# Patient Record
Sex: FEMALE | Race: WHITE
[De-identification: names, ages, dates, MRNs, and addresses within clinical notes are randomized per-mention and may not be internally consistent; named-entity substitution may affect disease eponyms.]

---

## 2018-01-23 ENCOUNTER — HOSPITAL ENCOUNTER (INPATIENT)
Dept: HOSPITAL 75 - LDRP | Age: 43
LOS: 4 days | Discharge: HOME | End: 2018-01-27
Attending: OBSTETRICS & GYNECOLOGY | Admitting: OBSTETRICS & GYNECOLOGY
Payer: MEDICAID

## 2018-01-23 VITALS — DIASTOLIC BLOOD PRESSURE: 61 MMHG | SYSTOLIC BLOOD PRESSURE: 126 MMHG

## 2018-01-23 VITALS — DIASTOLIC BLOOD PRESSURE: 57 MMHG | SYSTOLIC BLOOD PRESSURE: 112 MMHG

## 2018-01-23 VITALS — DIASTOLIC BLOOD PRESSURE: 57 MMHG | SYSTOLIC BLOOD PRESSURE: 124 MMHG

## 2018-01-23 VITALS — DIASTOLIC BLOOD PRESSURE: 74 MMHG | SYSTOLIC BLOOD PRESSURE: 120 MMHG

## 2018-01-23 VITALS — SYSTOLIC BLOOD PRESSURE: 91 MMHG | DIASTOLIC BLOOD PRESSURE: 50 MMHG

## 2018-01-23 VITALS — HEIGHT: 66 IN | BODY MASS INDEX: 35.68 KG/M2 | WEIGHT: 222 LBS

## 2018-01-23 VITALS — DIASTOLIC BLOOD PRESSURE: 55 MMHG | SYSTOLIC BLOOD PRESSURE: 109 MMHG

## 2018-01-23 VITALS — DIASTOLIC BLOOD PRESSURE: 53 MMHG | SYSTOLIC BLOOD PRESSURE: 107 MMHG

## 2018-01-23 VITALS — DIASTOLIC BLOOD PRESSURE: 51 MMHG | SYSTOLIC BLOOD PRESSURE: 103 MMHG

## 2018-01-23 VITALS — SYSTOLIC BLOOD PRESSURE: 102 MMHG | DIASTOLIC BLOOD PRESSURE: 51 MMHG

## 2018-01-23 VITALS — DIASTOLIC BLOOD PRESSURE: 55 MMHG | SYSTOLIC BLOOD PRESSURE: 107 MMHG

## 2018-01-23 VITALS — DIASTOLIC BLOOD PRESSURE: 90 MMHG | SYSTOLIC BLOOD PRESSURE: 118 MMHG

## 2018-01-23 VITALS — DIASTOLIC BLOOD PRESSURE: 56 MMHG | SYSTOLIC BLOOD PRESSURE: 114 MMHG

## 2018-01-23 VITALS — DIASTOLIC BLOOD PRESSURE: 74 MMHG | SYSTOLIC BLOOD PRESSURE: 125 MMHG

## 2018-01-23 VITALS — DIASTOLIC BLOOD PRESSURE: 56 MMHG | SYSTOLIC BLOOD PRESSURE: 116 MMHG

## 2018-01-23 VITALS — SYSTOLIC BLOOD PRESSURE: 111 MMHG | DIASTOLIC BLOOD PRESSURE: 56 MMHG

## 2018-01-23 VITALS — DIASTOLIC BLOOD PRESSURE: 55 MMHG | SYSTOLIC BLOOD PRESSURE: 116 MMHG

## 2018-01-23 VITALS — DIASTOLIC BLOOD PRESSURE: 65 MMHG | SYSTOLIC BLOOD PRESSURE: 120 MMHG

## 2018-01-23 VITALS — SYSTOLIC BLOOD PRESSURE: 72 MMHG | DIASTOLIC BLOOD PRESSURE: 40 MMHG

## 2018-01-23 VITALS — SYSTOLIC BLOOD PRESSURE: 120 MMHG | DIASTOLIC BLOOD PRESSURE: 71 MMHG

## 2018-01-23 VITALS — SYSTOLIC BLOOD PRESSURE: 122 MMHG | DIASTOLIC BLOOD PRESSURE: 72 MMHG

## 2018-01-23 VITALS — SYSTOLIC BLOOD PRESSURE: 105 MMHG | DIASTOLIC BLOOD PRESSURE: 56 MMHG

## 2018-01-23 VITALS — SYSTOLIC BLOOD PRESSURE: 99 MMHG | DIASTOLIC BLOOD PRESSURE: 53 MMHG

## 2018-01-23 VITALS — SYSTOLIC BLOOD PRESSURE: 104 MMHG | DIASTOLIC BLOOD PRESSURE: 54 MMHG

## 2018-01-23 VITALS — SYSTOLIC BLOOD PRESSURE: 85 MMHG | DIASTOLIC BLOOD PRESSURE: 52 MMHG

## 2018-01-23 VITALS — DIASTOLIC BLOOD PRESSURE: 52 MMHG | SYSTOLIC BLOOD PRESSURE: 102 MMHG

## 2018-01-23 VITALS — DIASTOLIC BLOOD PRESSURE: 59 MMHG | SYSTOLIC BLOOD PRESSURE: 110 MMHG

## 2018-01-23 VITALS — DIASTOLIC BLOOD PRESSURE: 61 MMHG | SYSTOLIC BLOOD PRESSURE: 106 MMHG

## 2018-01-23 VITALS — DIASTOLIC BLOOD PRESSURE: 75 MMHG | SYSTOLIC BLOOD PRESSURE: 135 MMHG

## 2018-01-23 VITALS — DIASTOLIC BLOOD PRESSURE: 60 MMHG | SYSTOLIC BLOOD PRESSURE: 110 MMHG

## 2018-01-23 VITALS — SYSTOLIC BLOOD PRESSURE: 118 MMHG | DIASTOLIC BLOOD PRESSURE: 69 MMHG

## 2018-01-23 VITALS — DIASTOLIC BLOOD PRESSURE: 65 MMHG | SYSTOLIC BLOOD PRESSURE: 117 MMHG

## 2018-01-23 VITALS — DIASTOLIC BLOOD PRESSURE: 58 MMHG | SYSTOLIC BLOOD PRESSURE: 123 MMHG

## 2018-01-23 VITALS — SYSTOLIC BLOOD PRESSURE: 126 MMHG | DIASTOLIC BLOOD PRESSURE: 71 MMHG

## 2018-01-23 VITALS — SYSTOLIC BLOOD PRESSURE: 115 MMHG | DIASTOLIC BLOOD PRESSURE: 62 MMHG

## 2018-01-23 VITALS — SYSTOLIC BLOOD PRESSURE: 117 MMHG | DIASTOLIC BLOOD PRESSURE: 58 MMHG

## 2018-01-23 VITALS — DIASTOLIC BLOOD PRESSURE: 56 MMHG | SYSTOLIC BLOOD PRESSURE: 108 MMHG

## 2018-01-23 VITALS — SYSTOLIC BLOOD PRESSURE: 110 MMHG | DIASTOLIC BLOOD PRESSURE: 59 MMHG

## 2018-01-23 VITALS — DIASTOLIC BLOOD PRESSURE: 67 MMHG | SYSTOLIC BLOOD PRESSURE: 107 MMHG

## 2018-01-23 VITALS — SYSTOLIC BLOOD PRESSURE: 122 MMHG | DIASTOLIC BLOOD PRESSURE: 57 MMHG

## 2018-01-23 VITALS — SYSTOLIC BLOOD PRESSURE: 114 MMHG | DIASTOLIC BLOOD PRESSURE: 60 MMHG

## 2018-01-23 VITALS — SYSTOLIC BLOOD PRESSURE: 109 MMHG | DIASTOLIC BLOOD PRESSURE: 58 MMHG

## 2018-01-23 VITALS — SYSTOLIC BLOOD PRESSURE: 113 MMHG | DIASTOLIC BLOOD PRESSURE: 56 MMHG

## 2018-01-23 VITALS — SYSTOLIC BLOOD PRESSURE: 122 MMHG | DIASTOLIC BLOOD PRESSURE: 59 MMHG

## 2018-01-23 VITALS — SYSTOLIC BLOOD PRESSURE: 117 MMHG | DIASTOLIC BLOOD PRESSURE: 67 MMHG

## 2018-01-23 VITALS — SYSTOLIC BLOOD PRESSURE: 126 MMHG | DIASTOLIC BLOOD PRESSURE: 70 MMHG

## 2018-01-23 VITALS — DIASTOLIC BLOOD PRESSURE: 63 MMHG | SYSTOLIC BLOOD PRESSURE: 100 MMHG

## 2018-01-23 VITALS — SYSTOLIC BLOOD PRESSURE: 118 MMHG | DIASTOLIC BLOOD PRESSURE: 68 MMHG

## 2018-01-23 VITALS — DIASTOLIC BLOOD PRESSURE: 59 MMHG | SYSTOLIC BLOOD PRESSURE: 106 MMHG

## 2018-01-23 VITALS — SYSTOLIC BLOOD PRESSURE: 128 MMHG | DIASTOLIC BLOOD PRESSURE: 78 MMHG

## 2018-01-23 VITALS — SYSTOLIC BLOOD PRESSURE: 112 MMHG | DIASTOLIC BLOOD PRESSURE: 64 MMHG

## 2018-01-23 VITALS — DIASTOLIC BLOOD PRESSURE: 56 MMHG | SYSTOLIC BLOOD PRESSURE: 106 MMHG

## 2018-01-23 VITALS — SYSTOLIC BLOOD PRESSURE: 113 MMHG | DIASTOLIC BLOOD PRESSURE: 63 MMHG

## 2018-01-23 VITALS — SYSTOLIC BLOOD PRESSURE: 116 MMHG | DIASTOLIC BLOOD PRESSURE: 61 MMHG

## 2018-01-23 VITALS — DIASTOLIC BLOOD PRESSURE: 63 MMHG | SYSTOLIC BLOOD PRESSURE: 117 MMHG

## 2018-01-23 VITALS — SYSTOLIC BLOOD PRESSURE: 118 MMHG | DIASTOLIC BLOOD PRESSURE: 57 MMHG

## 2018-01-23 VITALS — SYSTOLIC BLOOD PRESSURE: 104 MMHG | DIASTOLIC BLOOD PRESSURE: 63 MMHG

## 2018-01-23 VITALS — SYSTOLIC BLOOD PRESSURE: 121 MMHG | DIASTOLIC BLOOD PRESSURE: 77 MMHG

## 2018-01-23 VITALS — DIASTOLIC BLOOD PRESSURE: 63 MMHG | SYSTOLIC BLOOD PRESSURE: 104 MMHG

## 2018-01-23 VITALS — SYSTOLIC BLOOD PRESSURE: 105 MMHG | DIASTOLIC BLOOD PRESSURE: 51 MMHG

## 2018-01-23 VITALS — SYSTOLIC BLOOD PRESSURE: 118 MMHG | DIASTOLIC BLOOD PRESSURE: 59 MMHG

## 2018-01-23 VITALS — SYSTOLIC BLOOD PRESSURE: 100 MMHG | DIASTOLIC BLOOD PRESSURE: 52 MMHG

## 2018-01-23 VITALS — DIASTOLIC BLOOD PRESSURE: 58 MMHG | SYSTOLIC BLOOD PRESSURE: 105 MMHG

## 2018-01-23 VITALS — DIASTOLIC BLOOD PRESSURE: 57 MMHG | SYSTOLIC BLOOD PRESSURE: 122 MMHG

## 2018-01-23 VITALS — DIASTOLIC BLOOD PRESSURE: 65 MMHG | SYSTOLIC BLOOD PRESSURE: 112 MMHG

## 2018-01-23 VITALS — SYSTOLIC BLOOD PRESSURE: 110 MMHG | DIASTOLIC BLOOD PRESSURE: 54 MMHG

## 2018-01-23 VITALS — SYSTOLIC BLOOD PRESSURE: 104 MMHG | DIASTOLIC BLOOD PRESSURE: 58 MMHG

## 2018-01-23 VITALS — SYSTOLIC BLOOD PRESSURE: 114 MMHG | DIASTOLIC BLOOD PRESSURE: 55 MMHG

## 2018-01-23 VITALS — SYSTOLIC BLOOD PRESSURE: 122 MMHG | DIASTOLIC BLOOD PRESSURE: 75 MMHG

## 2018-01-23 VITALS — DIASTOLIC BLOOD PRESSURE: 59 MMHG | SYSTOLIC BLOOD PRESSURE: 107 MMHG

## 2018-01-23 VITALS — SYSTOLIC BLOOD PRESSURE: 124 MMHG | DIASTOLIC BLOOD PRESSURE: 66 MMHG

## 2018-01-23 VITALS — DIASTOLIC BLOOD PRESSURE: 55 MMHG | SYSTOLIC BLOOD PRESSURE: 110 MMHG

## 2018-01-23 VITALS — SYSTOLIC BLOOD PRESSURE: 118 MMHG | DIASTOLIC BLOOD PRESSURE: 73 MMHG

## 2018-01-23 VITALS — DIASTOLIC BLOOD PRESSURE: 57 MMHG | SYSTOLIC BLOOD PRESSURE: 117 MMHG

## 2018-01-23 VITALS — DIASTOLIC BLOOD PRESSURE: 66 MMHG | SYSTOLIC BLOOD PRESSURE: 113 MMHG

## 2018-01-23 VITALS — DIASTOLIC BLOOD PRESSURE: 62 MMHG | SYSTOLIC BLOOD PRESSURE: 110 MMHG

## 2018-01-23 DIAGNOSIS — O61.0: ICD-10-CM

## 2018-01-23 DIAGNOSIS — O32.2XX0: ICD-10-CM

## 2018-01-23 DIAGNOSIS — Z3A.39: ICD-10-CM

## 2018-01-23 DIAGNOSIS — O41.1230: ICD-10-CM

## 2018-01-23 DIAGNOSIS — D62: ICD-10-CM

## 2018-01-23 LAB
BASOPHILS # BLD AUTO: 0 10^3/UL (ref 0–0.1)
BASOPHILS NFR BLD AUTO: 0 % (ref 0–10)
EOSINOPHIL # BLD AUTO: 0.1 10^3/UL (ref 0–0.3)
EOSINOPHIL NFR BLD AUTO: 1 % (ref 0–10)
ERYTHROCYTE [DISTWIDTH] IN BLOOD BY AUTOMATED COUNT: 14.4 % (ref 10–14.5)
HCT VFR BLD CALC: 34 % (ref 35–52)
HGB BLD-MCNC: 11.5 G/DL (ref 11.5–16)
LYMPHOCYTES # BLD AUTO: 1.9 X 10^3 (ref 1–4)
LYMPHOCYTES NFR BLD AUTO: 24 % (ref 12–44)
MANUAL DIFFERENTIAL PERFORMED BLD QL: NO
MCH RBC QN AUTO: 28 PG (ref 25–34)
MCHC RBC AUTO-ENTMCNC: 34 G/DL (ref 32–36)
MCV RBC AUTO: 84 FL (ref 80–99)
MONOCYTES # BLD AUTO: 0.6 X 10^3 (ref 0–1)
MONOCYTES NFR BLD AUTO: 7 % (ref 0–12)
NEUTROPHILS # BLD AUTO: 5.5 X 10^3 (ref 1.8–7.8)
NEUTROPHILS NFR BLD AUTO: 68 % (ref 42–75)
PLATELET # BLD: 157 10^3/UL (ref 130–400)
PMV BLD AUTO: 11 FL (ref 7.4–10.4)
RBC # BLD AUTO: 4.07 10^6/UL (ref 4.35–5.85)
WBC # BLD AUTO: 8.1 10^3/UL (ref 4.3–11)

## 2018-01-23 PROCEDURE — 86850 RBC ANTIBODY SCREEN: CPT

## 2018-01-23 PROCEDURE — 86900 BLOOD TYPING SEROLOGIC ABO: CPT

## 2018-01-23 PROCEDURE — 94664 DEMO&/EVAL PT USE INHALER: CPT

## 2018-01-23 PROCEDURE — 36415 COLL VENOUS BLD VENIPUNCTURE: CPT

## 2018-01-23 PROCEDURE — 85025 COMPLETE CBC W/AUTO DIFF WBC: CPT

## 2018-01-23 PROCEDURE — 86901 BLOOD TYPING SEROLOGIC RH(D): CPT

## 2018-01-23 PROCEDURE — 10S0XZZ REPOSITION PRODUCTS OF CONCEPTION, EXTERNAL APPROACH: ICD-10-PCS | Performed by: OBSTETRICS & GYNECOLOGY

## 2018-01-23 RX ADMIN — PROPRANOLOL HYDROCHLORIDE PRN MG: 1 INJECTION INTRAVENOUS at 21:32

## 2018-01-23 RX ADMIN — PROPRANOLOL HYDROCHLORIDE PRN MG: 1 INJECTION INTRAVENOUS at 20:18

## 2018-01-23 RX ADMIN — Medication SCH ML: at 15:18

## 2018-01-23 RX ADMIN — Medication PRN ML: at 17:52

## 2018-01-23 RX ADMIN — SODIUM CHLORIDE, SODIUM LACTATE, POTASSIUM CHLORIDE, CALCIUM CHLORIDE, AND DEXTROSE MONOHYDRATE SCH MLS/HR: 600; 310; 30; 20; 5 INJECTION, SOLUTION INTRAVENOUS at 16:24

## 2018-01-23 RX ADMIN — Medication SCH ML: at 22:00

## 2018-01-23 RX ADMIN — PROPRANOLOL HYDROCHLORIDE PRN MG: 1 INJECTION INTRAVENOUS at 20:22

## 2018-01-23 RX ADMIN — SODIUM CHLORIDE, SODIUM LACTATE, POTASSIUM CHLORIDE, AND CALCIUM CHLORIDE SCH MLS/HR: 600; 310; 30; 20 INJECTION, SOLUTION INTRAVENOUS at 15:15

## 2018-01-23 RX ADMIN — Medication PRN ML: at 10:31

## 2018-01-23 NOTE — OPERATIVE REPORT
Operative Report


Date of Procedure/Surgery


2018


Surgeon (s)


LISHA LEYVA DO


Assistant (s):  JOSE ANTONIO Garcia





Post-Operative Diagnosis





fetal malpresentation, third trimester.





Procedure Performed





External cephalic version





Description of Procedure


Anesthesia Type:  EPI


Estimated blood loss (mL):  none


Specimen(s) collected/removed


none


Description of the Procedure


Patient presents for external cephalic version scheduled at 39 weeks.  She was 

found to be breech/transverse in the office last week.  Consents have been 

signed.  Risks and benefits of this procedure including potential complications 

were explained and and outlined in the patient's chart.  Patient is brought 

complications risks and consent were obtained with written consents and 

description and discussion.  Patient's adult daughter also serves as 

 as needed.





IV was started and IV bolus was given.  Patient then had an epidural placed.  

As the plan is to induce following version if this is successful or proceed 

with  if not.  She has not breakfast.  





She was given 0.2 mg subcutaneous terbutaline.  She was then placed in the 

supine position and position was confirmed with an ultrasound with the fetal 

head in the right upper quadrant and the breech in the left lower quadrant 

confirming a breech/oblique presentation.





She was placed in the right supine down position and pillows and wedges were 

used to keep her in this position.  I then removed the fetal monitors.  Fetal 

well-being was very good prior to removing these.  She was having no 

contractions.  I then used cornstarch to lubricate the abdomen.  Then gently 

pushed the head into the vertex presentation.  This was confirmed with 

ultrasound.  This took approximately 3 minutes.  Fetal well-being was 

reassuring afterwards and there was no evidence of fluid or rupture, bleeding, 

uterine rupture, fetal distress.





Patient was now examined  vaginally and cervix still 1 cm, but I was able to 

artificially rupture membranes and place a fetal scalp electrode.





Pitocin will be started for labor augmentation.





Patient and baby tolerated procedure well.


Findings of the Procedure


42 year old  at 39 weeks with malpresentation of fetus at 39 weeks.





Allergies and Home Medications


Allergies


Coded Allergies:  


     No Known Drug Allergies (Unverified , 12)





Home Medications


Docusate Sodium 100 Mg Capsule, 100 MG PO BID, (Reported)


Hydrocodone Bit/Acetaminophen 1 Tab Tablet, 1-2 TAB PO Q 3 HRS AS NEEDED, (

Reported)


Ibuprofen 800 Mg Tab, 800 MG PO Q 6HRS AS NEEDED, (Reported)


Prenatal Vits W-Ca,Fe,Fa(<1MG) 1 Each Tablet, 1 EACH PO DAILY, (Reported)











LISHA LEYVA DO 2018 2:06 pm

## 2018-01-23 NOTE — PROGRESS NOTE-STANDARD
Standard Progress Note


Progress Notes/Assess & Plan


Date Seen by Provider:  Jan 23, 2018


Time Seen by Provider:  12:00


Progress/Assessment & Plan


approximately 1 hour after fetal version, patient reported she thought the FSE 

had moved.  Bedside US confirmed that the head was again in the RUQ.  I was 

able to easily move the head back to the vertex presentation without fetal 

distress.  We then placed maternity belts/abdominal binders to keep the baby in 

position. 





Pitocin has been started.  Fluid is clear.


Final Diagnosis


Breech with version











LISHA LEYVA DO Jan 23, 2018 2:10 pm

## 2018-01-24 VITALS — DIASTOLIC BLOOD PRESSURE: 65 MMHG | SYSTOLIC BLOOD PRESSURE: 107 MMHG

## 2018-01-24 VITALS — DIASTOLIC BLOOD PRESSURE: 66 MMHG | SYSTOLIC BLOOD PRESSURE: 122 MMHG

## 2018-01-24 VITALS — DIASTOLIC BLOOD PRESSURE: 58 MMHG | SYSTOLIC BLOOD PRESSURE: 106 MMHG

## 2018-01-24 VITALS — SYSTOLIC BLOOD PRESSURE: 139 MMHG | DIASTOLIC BLOOD PRESSURE: 69 MMHG

## 2018-01-24 VITALS — DIASTOLIC BLOOD PRESSURE: 54 MMHG | SYSTOLIC BLOOD PRESSURE: 100 MMHG

## 2018-01-24 VITALS — SYSTOLIC BLOOD PRESSURE: 122 MMHG | DIASTOLIC BLOOD PRESSURE: 64 MMHG

## 2018-01-24 VITALS — DIASTOLIC BLOOD PRESSURE: 58 MMHG | SYSTOLIC BLOOD PRESSURE: 118 MMHG

## 2018-01-24 VITALS — DIASTOLIC BLOOD PRESSURE: 56 MMHG | SYSTOLIC BLOOD PRESSURE: 118 MMHG

## 2018-01-24 VITALS — SYSTOLIC BLOOD PRESSURE: 102 MMHG | DIASTOLIC BLOOD PRESSURE: 57 MMHG

## 2018-01-24 VITALS — DIASTOLIC BLOOD PRESSURE: 57 MMHG | SYSTOLIC BLOOD PRESSURE: 118 MMHG

## 2018-01-24 VITALS — DIASTOLIC BLOOD PRESSURE: 65 MMHG | SYSTOLIC BLOOD PRESSURE: 115 MMHG

## 2018-01-24 VITALS — SYSTOLIC BLOOD PRESSURE: 111 MMHG | DIASTOLIC BLOOD PRESSURE: 65 MMHG

## 2018-01-24 VITALS — SYSTOLIC BLOOD PRESSURE: 102 MMHG | DIASTOLIC BLOOD PRESSURE: 59 MMHG

## 2018-01-24 VITALS — DIASTOLIC BLOOD PRESSURE: 65 MMHG | SYSTOLIC BLOOD PRESSURE: 109 MMHG

## 2018-01-24 VITALS — SYSTOLIC BLOOD PRESSURE: 102 MMHG | DIASTOLIC BLOOD PRESSURE: 54 MMHG

## 2018-01-24 VITALS — DIASTOLIC BLOOD PRESSURE: 65 MMHG | SYSTOLIC BLOOD PRESSURE: 100 MMHG

## 2018-01-24 VITALS — SYSTOLIC BLOOD PRESSURE: 104 MMHG | DIASTOLIC BLOOD PRESSURE: 57 MMHG

## 2018-01-24 VITALS — DIASTOLIC BLOOD PRESSURE: 63 MMHG | SYSTOLIC BLOOD PRESSURE: 112 MMHG

## 2018-01-24 VITALS — DIASTOLIC BLOOD PRESSURE: 57 MMHG | SYSTOLIC BLOOD PRESSURE: 104 MMHG

## 2018-01-24 VITALS — SYSTOLIC BLOOD PRESSURE: 117 MMHG | DIASTOLIC BLOOD PRESSURE: 71 MMHG

## 2018-01-24 VITALS — SYSTOLIC BLOOD PRESSURE: 108 MMHG | DIASTOLIC BLOOD PRESSURE: 64 MMHG

## 2018-01-24 VITALS — SYSTOLIC BLOOD PRESSURE: 126 MMHG | DIASTOLIC BLOOD PRESSURE: 64 MMHG

## 2018-01-24 VITALS — DIASTOLIC BLOOD PRESSURE: 62 MMHG | SYSTOLIC BLOOD PRESSURE: 122 MMHG

## 2018-01-24 VITALS — SYSTOLIC BLOOD PRESSURE: 106 MMHG | DIASTOLIC BLOOD PRESSURE: 56 MMHG

## 2018-01-24 VITALS — DIASTOLIC BLOOD PRESSURE: 65 MMHG | SYSTOLIC BLOOD PRESSURE: 104 MMHG

## 2018-01-24 VITALS — DIASTOLIC BLOOD PRESSURE: 61 MMHG | SYSTOLIC BLOOD PRESSURE: 116 MMHG

## 2018-01-24 VITALS — DIASTOLIC BLOOD PRESSURE: 58 MMHG | SYSTOLIC BLOOD PRESSURE: 107 MMHG

## 2018-01-24 VITALS — DIASTOLIC BLOOD PRESSURE: 64 MMHG | SYSTOLIC BLOOD PRESSURE: 105 MMHG

## 2018-01-24 VITALS — SYSTOLIC BLOOD PRESSURE: 144 MMHG | DIASTOLIC BLOOD PRESSURE: 87 MMHG

## 2018-01-24 VITALS — SYSTOLIC BLOOD PRESSURE: 114 MMHG | DIASTOLIC BLOOD PRESSURE: 58 MMHG

## 2018-01-24 VITALS — DIASTOLIC BLOOD PRESSURE: 60 MMHG | SYSTOLIC BLOOD PRESSURE: 120 MMHG

## 2018-01-24 VITALS — SYSTOLIC BLOOD PRESSURE: 125 MMHG | DIASTOLIC BLOOD PRESSURE: 57 MMHG

## 2018-01-24 VITALS — DIASTOLIC BLOOD PRESSURE: 61 MMHG | SYSTOLIC BLOOD PRESSURE: 120 MMHG

## 2018-01-24 VITALS — DIASTOLIC BLOOD PRESSURE: 67 MMHG | SYSTOLIC BLOOD PRESSURE: 114 MMHG

## 2018-01-24 VITALS — SYSTOLIC BLOOD PRESSURE: 134 MMHG | DIASTOLIC BLOOD PRESSURE: 68 MMHG

## 2018-01-24 VITALS — DIASTOLIC BLOOD PRESSURE: 60 MMHG | SYSTOLIC BLOOD PRESSURE: 112 MMHG

## 2018-01-24 VITALS — SYSTOLIC BLOOD PRESSURE: 120 MMHG | DIASTOLIC BLOOD PRESSURE: 63 MMHG

## 2018-01-24 VITALS — DIASTOLIC BLOOD PRESSURE: 55 MMHG | SYSTOLIC BLOOD PRESSURE: 122 MMHG

## 2018-01-24 VITALS — DIASTOLIC BLOOD PRESSURE: 62 MMHG | SYSTOLIC BLOOD PRESSURE: 104 MMHG

## 2018-01-24 VITALS — SYSTOLIC BLOOD PRESSURE: 116 MMHG | DIASTOLIC BLOOD PRESSURE: 62 MMHG

## 2018-01-24 VITALS — DIASTOLIC BLOOD PRESSURE: 64 MMHG | SYSTOLIC BLOOD PRESSURE: 108 MMHG

## 2018-01-24 VITALS — DIASTOLIC BLOOD PRESSURE: 57 MMHG | SYSTOLIC BLOOD PRESSURE: 117 MMHG

## 2018-01-24 VITALS — SYSTOLIC BLOOD PRESSURE: 111 MMHG | DIASTOLIC BLOOD PRESSURE: 57 MMHG

## 2018-01-24 VITALS — SYSTOLIC BLOOD PRESSURE: 117 MMHG | DIASTOLIC BLOOD PRESSURE: 55 MMHG

## 2018-01-24 VITALS — DIASTOLIC BLOOD PRESSURE: 56 MMHG | SYSTOLIC BLOOD PRESSURE: 112 MMHG

## 2018-01-24 VITALS — SYSTOLIC BLOOD PRESSURE: 118 MMHG | DIASTOLIC BLOOD PRESSURE: 66 MMHG

## 2018-01-24 VITALS — SYSTOLIC BLOOD PRESSURE: 105 MMHG | DIASTOLIC BLOOD PRESSURE: 59 MMHG

## 2018-01-24 VITALS — SYSTOLIC BLOOD PRESSURE: 113 MMHG | DIASTOLIC BLOOD PRESSURE: 61 MMHG

## 2018-01-24 VITALS — DIASTOLIC BLOOD PRESSURE: 59 MMHG | SYSTOLIC BLOOD PRESSURE: 129 MMHG

## 2018-01-24 VITALS — SYSTOLIC BLOOD PRESSURE: 113 MMHG | DIASTOLIC BLOOD PRESSURE: 55 MMHG

## 2018-01-24 VITALS — SYSTOLIC BLOOD PRESSURE: 123 MMHG | DIASTOLIC BLOOD PRESSURE: 57 MMHG

## 2018-01-24 VITALS — SYSTOLIC BLOOD PRESSURE: 101 MMHG | DIASTOLIC BLOOD PRESSURE: 52 MMHG

## 2018-01-24 VITALS — SYSTOLIC BLOOD PRESSURE: 129 MMHG | DIASTOLIC BLOOD PRESSURE: 58 MMHG

## 2018-01-24 VITALS — SYSTOLIC BLOOD PRESSURE: 122 MMHG | DIASTOLIC BLOOD PRESSURE: 63 MMHG

## 2018-01-24 VITALS — DIASTOLIC BLOOD PRESSURE: 55 MMHG | SYSTOLIC BLOOD PRESSURE: 125 MMHG

## 2018-01-24 VITALS — DIASTOLIC BLOOD PRESSURE: 59 MMHG | SYSTOLIC BLOOD PRESSURE: 124 MMHG

## 2018-01-24 VITALS — SYSTOLIC BLOOD PRESSURE: 106 MMHG | DIASTOLIC BLOOD PRESSURE: 55 MMHG

## 2018-01-24 VITALS — SYSTOLIC BLOOD PRESSURE: 111 MMHG | DIASTOLIC BLOOD PRESSURE: 56 MMHG

## 2018-01-24 VITALS — DIASTOLIC BLOOD PRESSURE: 62 MMHG | SYSTOLIC BLOOD PRESSURE: 109 MMHG

## 2018-01-24 VITALS — DIASTOLIC BLOOD PRESSURE: 63 MMHG | SYSTOLIC BLOOD PRESSURE: 132 MMHG

## 2018-01-24 VITALS — SYSTOLIC BLOOD PRESSURE: 103 MMHG | DIASTOLIC BLOOD PRESSURE: 55 MMHG

## 2018-01-24 VITALS — DIASTOLIC BLOOD PRESSURE: 64 MMHG | SYSTOLIC BLOOD PRESSURE: 136 MMHG

## 2018-01-24 VITALS — SYSTOLIC BLOOD PRESSURE: 112 MMHG | DIASTOLIC BLOOD PRESSURE: 60 MMHG

## 2018-01-24 VITALS — SYSTOLIC BLOOD PRESSURE: 114 MMHG | DIASTOLIC BLOOD PRESSURE: 64 MMHG

## 2018-01-24 VITALS — DIASTOLIC BLOOD PRESSURE: 57 MMHG | SYSTOLIC BLOOD PRESSURE: 114 MMHG

## 2018-01-24 VITALS — DIASTOLIC BLOOD PRESSURE: 77 MMHG | SYSTOLIC BLOOD PRESSURE: 129 MMHG

## 2018-01-24 VITALS — SYSTOLIC BLOOD PRESSURE: 99 MMHG | DIASTOLIC BLOOD PRESSURE: 53 MMHG

## 2018-01-24 VITALS — SYSTOLIC BLOOD PRESSURE: 102 MMHG | DIASTOLIC BLOOD PRESSURE: 51 MMHG

## 2018-01-24 VITALS — DIASTOLIC BLOOD PRESSURE: 64 MMHG | SYSTOLIC BLOOD PRESSURE: 122 MMHG

## 2018-01-24 VITALS — SYSTOLIC BLOOD PRESSURE: 105 MMHG | DIASTOLIC BLOOD PRESSURE: 55 MMHG

## 2018-01-24 VITALS — DIASTOLIC BLOOD PRESSURE: 63 MMHG | SYSTOLIC BLOOD PRESSURE: 110 MMHG

## 2018-01-24 VITALS — DIASTOLIC BLOOD PRESSURE: 58 MMHG | SYSTOLIC BLOOD PRESSURE: 116 MMHG

## 2018-01-24 VITALS — SYSTOLIC BLOOD PRESSURE: 99 MMHG | DIASTOLIC BLOOD PRESSURE: 54 MMHG

## 2018-01-24 RX ADMIN — Medication PRN ML: at 08:31

## 2018-01-24 RX ADMIN — SODIUM CHLORIDE, SODIUM LACTATE, POTASSIUM CHLORIDE, CALCIUM CHLORIDE, AND DEXTROSE MONOHYDRATE SCH MLS/HR: 600; 310; 30; 20; 5 INJECTION, SOLUTION INTRAVENOUS at 00:00

## 2018-01-24 RX ADMIN — SODIUM CHLORIDE, SODIUM LACTATE, POTASSIUM CHLORIDE, CALCIUM CHLORIDE, AND DEXTROSE MONOHYDRATE SCH MLS/HR: 600; 310; 30; 20; 5 INJECTION, SOLUTION INTRAVENOUS at 08:10

## 2018-01-24 RX ADMIN — Medication PRN ML: at 16:18

## 2018-01-24 RX ADMIN — SODIUM CHLORIDE, SODIUM LACTATE, POTASSIUM CHLORIDE, CALCIUM CHLORIDE, AND DEXTROSE MONOHYDRATE SCH MLS/HR: 600; 310; 30; 20; 5 INJECTION, SOLUTION INTRAVENOUS at 16:18

## 2018-01-24 RX ADMIN — HYDROCODONE BITARTRATE AND ACETAMINOPHEN PRN TAB: 5; 325 TABLET ORAL at 21:33

## 2018-01-24 RX ADMIN — DOCUSATE SODIUM SCH MG: 100 CAPSULE ORAL at 21:33

## 2018-01-24 RX ADMIN — SODIUM CHLORIDE, SODIUM LACTATE, POTASSIUM CHLORIDE, AND CALCIUM CHLORIDE SCH MLS/HR: 600; 310; 30; 20 INJECTION, SOLUTION INTRAVENOUS at 18:15

## 2018-01-24 RX ADMIN — KETOROLAC TROMETHAMINE SCH MG: 30 INJECTION, SOLUTION INTRAMUSCULAR; INTRAVENOUS at 19:30

## 2018-01-24 RX ADMIN — Medication PRN ML: at 01:06

## 2018-01-24 NOTE — PROGRESS NOTE-STANDARD
Standard Progress Note


Progress Notes/Assess & Plan


Date Seen by Provider:  2018


Time Seen by Provider:  13:30


Progress/Assessment & Plan





Patient ruptured > 24 hours but no evidence of chorioamnionitis.





4 cm dilated/ 60% effaced.  Continues to have regular contractions.  fetal well 

being continues to be reassuring.  26 mu of pitocin.  Will continue 

augmentation. continues to decline  section.











LISHA LEYVA DO 2018 18:33

## 2018-01-24 NOTE — OPERATIVE REPORT
Operative Report


Date of Procedure/Surgery


2018


Surgeon (s)


LISHA LEYVA DO


Assistant (s):  JOSE ANTONIO Garcia





Post-Operative Diagnosis





protracted labor, arrest of dilatation, advanced maternal age > 40


malpresentation/unstable lie, chorioramnionitis





Procedure Performed





Primary low transverse  section





Description of Procedure


Anesthesia Type:  EPI


Estimated blood loss (mL):  750


Specimen(s) collected/removed


none


Description of the Procedure





The patient was seen in pre-op and the procedure was discussed with the patient 

in full, including the risks, benefits, and alternatives. All questions were 

answered. Her daughter assisted with interpretation (patient is deaf).The 

patient was taken to the operating room and a time out was performed, verifying 

patient and procedure.


After epidural  anesthesia was redosed and found to be adequate by our 

anesthesia colleagues, the patient was placed in the dorsal supine with 

leftward tilt for uterine displacement.~ Her abdomen was then prepped and 

draped in the typical sterile fashion. A Pfannenstiel skin incision was made 

using a scalpel and carried down through the underlying fascia. The fascia was 

incised in the midline and tented up using Kocher clamps. On both the inferior 

and superior fascia side the rectus muscle was dissected off bluntly and 

sharply using Currie scissors. The peritoneum was identified and entered bluntly 

in the midline. This was then stretched laterally using manual strength. After 

entering the abdominal cavity and confirming lack of intraperitoneal adhesions, 

a large Edwin retractor was placed and the lower uterine segment was 

visualized.  A scalpel was utilized to make a low transverse uterine incision.  

The fetus had been breech and had successful version.  Abdominal binders were 

used to keep fetus in cephalic presentation. When cervix was checked most 

recently, fetus was still in cephalic presentation. However, the abdominal 

binder was removed.  The infantwas now noted to be in the transverse back up 

presentation. The fetal head was grasped and brought to the level of the 

incision. Silastic suction was placed on the fetal head facilitating delivery 

of the head. Fundal pressure was applied and infant was delivered without 

difficulty. Mouth and nares were suctioned with bulb suction. After the 

umbilical cord was clamped and cut, the infant was handed off to the pediatric 

staff. Baby was vigorous and crying. A sample of cord blood was then obtained.


The placenta was delivered intact via uterine massage. The uterus was 

exteriorized and cleared of all clots and debris. The uterine incision was 

closed using 0 Vicryl in a running locked fashion. A second imbricated layer 

was placed using 0 Vicryl in a running fashion as well. The uterus was flexed 

forward and the posterior rectouterine space was inspected and cleared of all 

clots and debris. Again the hysterotomy site was examined and hemostasis was 

observed. The bilateral tubes and ovaries appeared normal. The uterus was 

placed back into the abdominal cavity and abdominal gutters were cleared of all 

clots and debris. A final check of the uterine incision showed it to be 

hemostatic. The abdomen was copiously irrigated.  The peritoneum was closed 

using 3-0 Vicryl in a running fashion. The fascia was closed with 0 Vicryl in a 

running fashion. The subcutaneous space was hemostatic, and irrigated. The 

subcutaneous space was closed with 3-0 Vicryl in several single interrupted 

stitches. The skin was then closed using 4-0 Monocryl in a running subcuticular 

fashion. The skin edges were reapproximated together and were hemostatic. A 

pressure dressing was applied. All sponge, lap and needle counts were correct 

at the end of the procedure per nursing.


Findings of the Procedure


viable female





8#9oz


transverse back up





Allergies and Home Medications


Allergies


Coded Allergies:  


     No Known Drug Allergies (Unverified , 12)





Home Medications


Docusate Sodium 100 Mg Capsule, 100 MG PO BID, (Reported)


Hydrocodone Bit/Acetaminophen 1 Tab Tablet, 1-2 TAB PO Q 3 HRS AS NEEDED, (

Reported)


Ibuprofen 800 Mg Tab, 800 MG PO Q 6HRS AS NEEDED, (Reported)


Prenatal Vits W-Ca,Fe,Fa(<1MG) 1 Each Tablet, 1 EACH PO DAILY, (Reported)











LISHA LEYVA DO 2018 20:01

## 2018-01-24 NOTE — PROGRESS NOTE-STANDARD
Standard Progress Note


Progress Notes/Assess & Plan


Date Seen by Provider:  2018


Time Seen by Provider:  18:20


Progress/Assessment & Plan





Ruptured > 24 hours, continues to contract regularly Pitocin 50 mu greater than 

1 hour.


4 cm dilated.  


T 99.  Unasyn started. 


Will plan primary  section due to arrest of dilation.


Risks, bleeding, infection, injury to bowel bladder ureter and fetus.  


Consent signed.  Has received Unasyn.  Will give 500  mg IV Zithromax





Temp 99.3


fetal heart tones 140s











LISHA LEYVA DO 2018 18:36

## 2018-01-24 NOTE — PROGRESS NOTE-STANDARD
Standard Progress Note


Progress Notes/Assess & Plan


Date Seen by Provider:  2018


Time Seen by Provider:  08:30


Progress/Assessment & Plan





Patient had regular contractions throughout the day yesterday.  Fetal well 

being has been very reassuring.  However, little to no cervical change.  IV 

propranolol given 2 mg x 1, 1 hour apart due to protracted labor.


this did not improve the dilation.  Stopped Pitocin overnight to allow her to 

rest.  Contractions almost sopped.  


This am Pitocin was restarted.  Currently at 6 mu.  Cervix 3/50/-2.





Fetal well being reassuring.  Continue augmentation. Patient still continues to 

decline  section.











LISHA LEYVA DO 2018 18:31

## 2018-01-25 VITALS — DIASTOLIC BLOOD PRESSURE: 68 MMHG | SYSTOLIC BLOOD PRESSURE: 111 MMHG

## 2018-01-25 VITALS — DIASTOLIC BLOOD PRESSURE: 64 MMHG | SYSTOLIC BLOOD PRESSURE: 114 MMHG

## 2018-01-25 VITALS — DIASTOLIC BLOOD PRESSURE: 71 MMHG | SYSTOLIC BLOOD PRESSURE: 123 MMHG

## 2018-01-25 VITALS — SYSTOLIC BLOOD PRESSURE: 115 MMHG | DIASTOLIC BLOOD PRESSURE: 65 MMHG

## 2018-01-25 VITALS — SYSTOLIC BLOOD PRESSURE: 112 MMHG | DIASTOLIC BLOOD PRESSURE: 59 MMHG

## 2018-01-25 VITALS — DIASTOLIC BLOOD PRESSURE: 68 MMHG | SYSTOLIC BLOOD PRESSURE: 116 MMHG

## 2018-01-25 LAB
BASOPHILS # BLD AUTO: 0 10^3/UL (ref 0–0.1)
BASOPHILS NFR BLD AUTO: 0 % (ref 0–10)
EOSINOPHIL # BLD AUTO: 0.1 10^3/UL (ref 0–0.3)
EOSINOPHIL NFR BLD AUTO: 1 % (ref 0–10)
ERYTHROCYTE [DISTWIDTH] IN BLOOD BY AUTOMATED COUNT: 14.5 % (ref 10–14.5)
HCT VFR BLD CALC: 27 % (ref 35–52)
HGB BLD-MCNC: 8.9 G/DL (ref 11.5–16)
LYMPHOCYTES # BLD AUTO: 1.7 X 10^3 (ref 1–4)
LYMPHOCYTES NFR BLD AUTO: 15 % (ref 12–44)
MANUAL DIFFERENTIAL PERFORMED BLD QL: NO
MCH RBC QN AUTO: 28 PG (ref 25–34)
MCHC RBC AUTO-ENTMCNC: 33 G/DL (ref 32–36)
MCV RBC AUTO: 86 FL (ref 80–99)
MONOCYTES # BLD AUTO: 0.6 X 10^3 (ref 0–1)
MONOCYTES NFR BLD AUTO: 5 % (ref 0–12)
NEUTROPHILS # BLD AUTO: 9 X 10^3 (ref 1.8–7.8)
NEUTROPHILS NFR BLD AUTO: 79 % (ref 42–75)
PLATELET # BLD: 129 10^3/UL (ref 130–400)
PMV BLD AUTO: 10.8 FL (ref 7.4–10.4)
RBC # BLD AUTO: 3.18 10^6/UL (ref 4.35–5.85)
WBC # BLD AUTO: 11.3 10^3/UL (ref 4.3–11)

## 2018-01-25 RX ADMIN — DOCUSATE SODIUM SCH MG: 100 CAPSULE ORAL at 21:23

## 2018-01-25 RX ADMIN — IBUPROFEN SCH MG: 600 TABLET ORAL at 17:15

## 2018-01-25 RX ADMIN — HYDROCODONE BITARTRATE AND ACETAMINOPHEN PRN TAB: 5; 325 TABLET ORAL at 14:05

## 2018-01-25 RX ADMIN — KETOROLAC TROMETHAMINE SCH MG: 30 INJECTION, SOLUTION INTRAMUSCULAR; INTRAVENOUS at 09:15

## 2018-01-25 RX ADMIN — FERROUS SULFATE TAB 325 MG (65 MG ELEMENTAL FE) SCH MG: 325 (65 FE) TAB at 09:15

## 2018-01-25 RX ADMIN — HYDROCODONE BITARTRATE AND ACETAMINOPHEN PRN TAB: 5; 325 TABLET ORAL at 06:39

## 2018-01-25 RX ADMIN — HYDROCODONE BITARTRATE AND ACETAMINOPHEN PRN TAB: 5; 325 TABLET ORAL at 10:00

## 2018-01-25 RX ADMIN — AMPICILLIN SODIUM AND SULBACTAM SODIUM SCH MLS/HR: 2; 1 INJECTION, POWDER, FOR SOLUTION INTRAMUSCULAR; INTRAVENOUS at 01:19

## 2018-01-25 RX ADMIN — AMPICILLIN SODIUM AND SULBACTAM SODIUM SCH MLS/HR: 2; 1 INJECTION, POWDER, FOR SOLUTION INTRAMUSCULAR; INTRAVENOUS at 09:15

## 2018-01-25 RX ADMIN — DOCUSATE SODIUM SCH MG: 100 CAPSULE ORAL at 09:15

## 2018-01-25 RX ADMIN — HYDROCODONE BITARTRATE AND ACETAMINOPHEN PRN TAB: 5; 325 TABLET ORAL at 21:35

## 2018-01-25 RX ADMIN — KETOROLAC TROMETHAMINE SCH MG: 30 INJECTION, SOLUTION INTRAMUSCULAR; INTRAVENOUS at 01:18

## 2018-01-25 NOTE — ANESTHESIA-REGIONAL POST-OP
Regional


Patient Condition


Mental Status:  Alert, Oriented x3


Circulation:  Same as Pre-Op


Headache:  Absent


Sensation:  Full Recovery


Motor Block:  Absent





Post Op Complications


Complications


None





Follow Up Care/Instructions


Patient Instructions


None needed.





Anesthesia/Patient Condition


Patient is doing well, no complaints, stable vital signs, no apparent adverse 

anesthesia problems. Epidural used for C/S without complications, pt ambulating 

without difficulty.











BETO NAGY DO Jan 25, 2018 14:28

## 2018-01-25 NOTE — POSTPARTUM PROGRESS NOTE
Post Op


Post-operative Day #1 s/p PLTCS , arrest of dilation





Subjective:


Patient is without complaints. Ambulating, voiding after fuentes removed. 

Tolerating a regular diet without nausea or vomiting. Normal lochia. Pain is 

well controlled with oral pain medications. Passing flatus.  breast feeding. []





Objective:





Laboratory Tests








Test


  18


05:47 Range/Units


 


 


White Blood Count


  11.3 H


  4.3-11.0


10^3/uL


 


Red Blood Count


  3.18 L


  4.35-5.85


10^6/uL


 


Hemoglobin 8.9 #L 11.5-16.0  G/DL


 


Hematocrit 27 L 35-52  %


 


Mean Corpuscular Volume 86  80-99  FL


 


Mean Corpuscular Hemoglobin 28  25-34  PG


 


Mean Corpuscular Hemoglobin


Concent 33 


  32-36  G/DL


 


 


Red Cell Distribution Width 14.5  10.0-14.5  %


 


Platelet Count


  129 L


  130-400


10^3/uL


 


Mean Platelet Volume 10.8 H 7.4-10.4  FL


 


Neutrophils (%) (Auto) 79 H 42-75  %


 


Lymphocytes (%) (Auto) 15  12-44  %


 


Monocytes (%) (Auto) 5  0-12  %


 


Eosinophils (%) (Auto) 1  0-10  %


 


Basophils (%) (Auto) 0  0-10  %


 


Neutrophils # (Auto) 9.0 H 1.8-7.8  X 10^3


 


Lymphocytes # (Auto) 1.7  1.0-4.0  X 10^3


 


Monocytes # (Auto) 0.6  0.0-1.0  X 10^3


 


Eosinophils # (Auto)


  0.1 


  0.0-0.3


10^3/uL


 


Basophils # (Auto)


  0.0 


  0.0-0.1


10^3/uL











Vital Sign - Last 12Hours








 18





 21:00 22:18 01:25 06:05


 


Temp 97.2  96.5 97.8


 


Pulse 108  100 93


 


Resp 


 


B/P (MAP) 109/62 (78)  112/59 (76) 123/71 (88)


 


Pulse Ox 97  96 99


 


O2 Delivery Room Air Room Air Room Air Room Air














Intake and Output 


 


 18





 00:00


 


Intake Total 500 ml


 


Output Total 1010 ml


 


Balance -510 ml








Physical Exam:


General - Alert and oriented, no apparent distress


Abdomen - Soft, appropriately tender to palpation, non-distended, fundus firm 

at umbilicus


Incision - clean, dry and intact; no erythema or induration, no drainage


Extremities - no edema, negative Jay Jay's bilaterally


[]





Assessment:


1. post-operative day # 1, status post PLTCS.


Recovering well, hemodynamically stable


2. Acute blood loss anemia








Plan:


Routine post-operative care.


Encourage breast feeding.


Encourage ambulation.


VTE prophylaxis:  SCDs.


Ferrous sulfate supplementation.


Plan for discharge Friday or .





Vitals - Labs


Vital Signs - I&O





Vital Signs








  Date Time  Temp Pulse Resp B/P (MAP) Pulse Ox O2 Delivery O2 Flow Rate FiO2


 


18 06:05 97.8 93 18 123/71 (88) 99 Room Air  


 


18 01:25 96.5 100 18 112/59 (76) 96 Room Air  


 


18 22:18      Room Air  


 


18 21:00 97.2 108 18 109/62 (78) 97 Room Air  


 


18 18:45  117 20 144/87 (106)  Room Air  


 


18 18:35 99.5       


 


18 18:30  111 20 139/69 (92)  Room Air  


 


18 18:15  102 20 132/63 (86)  Room Air  


 


18 18:00  89 18 134/68 (90) 99 Room Air  


 


18 17:45 98.7 100 18 129/77 (94) 100 Room Air  


 


18 17:30  89 18 120/61 (80) 98 Room Air  


 


18 17:15  95 18 126/64 (84) 100 Room Air  


 


18 17:03 99.3       


 


18 17:00  88 18 116/58 (77) 100 Room Air  


 


18 16:45  83 18 129/58 (81) 100 Room Air  


 


18 16:30 99.3 83 18 106/58 (74) 98 Room Air  


 


18 16:15  85 18 106/56 (73) 97 Room Air  


 


18 16:00  86 18 104/57 (73) 96 Room Air  


 


18 15:45  83 18 104/57 (73) 97 Room Air  


 


18 15:30 99.3 96 18 122/64 (83) 99 Room Air  


 


18 15:15  102 18 122/64 (83) 99 Room Air  


 


18 15:00  96 18 117/55 (75) 98 Room Air  


 


18 14:45  94 18 124/59 (80) 98 Room Air  


 


18 14:30  93 18 120/63 (82) 98 Room Air  


 


18 14:15  111 18 129/59 (82) 97 Room Air  


 


18 14:00  99 18 125/55 (78) 99 Room Air  


 


18 13:45 98.7 100 18 136/64 (88) 100 Room Air  


 


18 13:30  91 18 112/60 (77) 100 Room Air  


 


18 13:15  91 18 112/60 (77) 100 Room Air  


 


18 13:00  88 18 115/65 (82) 100 Room Air  


 


18 12:45  90 18 110/63 (79) 99 Room Air  


 


18 12:30 97.2 86 18 122/63 (82) 98 Room Air  


 


18 12:15  88 18 104/65 (78) 96 Room Air  


 


18 12:00  81 18 105/64 (78) 92 Room Air  


 


18 11:45  80 18 104/62 (76) 96 Room Air  


 


18 11:30  88 18 100/65 (77) 98 Room Air  


 


18 11:15  87 18 102/59 (73) 97 Room Air  


 


18 11:00  85 18 107/58 (74) 98 Room Air  


 


18 10:45 98.1 85 18 111/65 (80) 98 Room Air  


 


18 10:30  84 18 108/64 (79) 96 Room Air  


 


18 10:15  84 18 108/64 (79) 96 Room Air  


 


18 10:00  96 18 108/64 (79) 98 Room Air  


 


18 09:45  86 18 107/65 (79) 99 Room Air  


 


18 09:30  86 18 112/56 (74) 99 Room Air  


 


18 09:15  81 18 116/61 (79) 99 Room Air  


 


18 09:00  86 18 123/57 (79) 100 Room Air  














I & O 


 


 18





 07:00


 


Intake Total 2300 ml


 


Output Total 1310 ml


 


Balance 990 ml











Labs


Laboratory Tests


18 05:47: 


White Blood Count 11.3H, Red Blood Count 3.18L, Hemoglobin 8.9#L, Hematocrit 27L

, Mean Corpuscular Volume 86, Mean Corpuscular Hemoglobin 28, Mean Corpuscular 

Hemoglobin Concent 33, Red Cell Distribution Width 14.5, Platelet Count 129L, 

Mean Platelet Volume 10.8H, Neutrophils (%) (Auto) 79H, Lymphocytes (%) (Auto) 

15, Monocytes (%) (Auto) 5, Eosinophils (%) (Auto) 1, Basophils (%) (Auto) 0, 

Neutrophils # (Auto) 9.0H, Lymphocytes # (Auto) 1.7, Monocytes # (Auto) 0.6, 

Eosinophils # (Auto) 0.1, Basophils # (Auto) 0.0











LISHA LEYVA DO 2018 08:54

## 2018-01-26 VITALS — SYSTOLIC BLOOD PRESSURE: 151 MMHG | DIASTOLIC BLOOD PRESSURE: 86 MMHG

## 2018-01-26 VITALS — SYSTOLIC BLOOD PRESSURE: 120 MMHG | DIASTOLIC BLOOD PRESSURE: 70 MMHG

## 2018-01-26 VITALS — SYSTOLIC BLOOD PRESSURE: 107 MMHG | DIASTOLIC BLOOD PRESSURE: 63 MMHG

## 2018-01-26 VITALS — SYSTOLIC BLOOD PRESSURE: 121 MMHG | DIASTOLIC BLOOD PRESSURE: 81 MMHG

## 2018-01-26 VITALS — SYSTOLIC BLOOD PRESSURE: 125 MMHG | DIASTOLIC BLOOD PRESSURE: 83 MMHG

## 2018-01-26 RX ADMIN — FERROUS SULFATE TAB 325 MG (65 MG ELEMENTAL FE) SCH MG: 325 (65 FE) TAB at 08:55

## 2018-01-26 RX ADMIN — IBUPROFEN SCH MG: 600 TABLET ORAL at 06:35

## 2018-01-26 RX ADMIN — IBUPROFEN SCH MG: 600 TABLET ORAL at 17:44

## 2018-01-26 RX ADMIN — HYDROCODONE BITARTRATE AND ACETAMINOPHEN PRN TAB: 5; 325 TABLET ORAL at 01:55

## 2018-01-26 RX ADMIN — HYDROCODONE BITARTRATE AND ACETAMINOPHEN PRN TAB: 5; 325 TABLET ORAL at 23:43

## 2018-01-26 RX ADMIN — SODIUM CHLORIDE, SODIUM LACTATE, POTASSIUM CHLORIDE, CALCIUM CHLORIDE, AND DEXTROSE MONOHYDRATE SCH MLS/HR: 600; 310; 30; 20; 5 INJECTION, SOLUTION INTRAVENOUS at 17:48

## 2018-01-26 RX ADMIN — FERROUS SULFATE TAB 325 MG (65 MG ELEMENTAL FE) SCH MG: 325 (65 FE) TAB at 11:23

## 2018-01-26 RX ADMIN — HYDROCODONE BITARTRATE AND ACETAMINOPHEN PRN TAB: 5; 325 TABLET ORAL at 12:46

## 2018-01-26 RX ADMIN — DOCUSATE SODIUM SCH MG: 100 CAPSULE ORAL at 08:56

## 2018-01-26 RX ADMIN — IBUPROFEN SCH MG: 600 TABLET ORAL at 20:37

## 2018-01-26 RX ADMIN — IBUPROFEN SCH MG: 600 TABLET ORAL at 15:15

## 2018-01-26 RX ADMIN — FERROUS SULFATE TAB 325 MG (65 MG ELEMENTAL FE) SCH MG: 325 (65 FE) TAB at 17:49

## 2018-01-26 RX ADMIN — IBUPROFEN SCH MG: 600 TABLET ORAL at 01:15

## 2018-01-26 RX ADMIN — FERROUS SULFATE TAB 325 MG (65 MG ELEMENTAL FE) SCH MG: 325 (65 FE) TAB at 17:45

## 2018-01-26 RX ADMIN — Medication SCH ML: at 17:48

## 2018-01-26 RX ADMIN — DOCUSATE SODIUM SCH MG: 100 CAPSULE ORAL at 20:36

## 2018-01-27 VITALS — DIASTOLIC BLOOD PRESSURE: 76 MMHG | SYSTOLIC BLOOD PRESSURE: 129 MMHG

## 2018-01-27 VITALS — SYSTOLIC BLOOD PRESSURE: 131 MMHG | DIASTOLIC BLOOD PRESSURE: 72 MMHG

## 2018-01-27 VITALS — SYSTOLIC BLOOD PRESSURE: 122 MMHG | DIASTOLIC BLOOD PRESSURE: 71 MMHG

## 2018-01-27 VITALS — SYSTOLIC BLOOD PRESSURE: 124 MMHG | DIASTOLIC BLOOD PRESSURE: 70 MMHG

## 2018-01-27 VITALS — DIASTOLIC BLOOD PRESSURE: 69 MMHG | SYSTOLIC BLOOD PRESSURE: 142 MMHG

## 2018-01-27 RX ADMIN — IBUPROFEN SCH MG: 600 TABLET ORAL at 23:37

## 2018-01-27 RX ADMIN — DOCUSATE SODIUM SCH MG: 100 CAPSULE ORAL at 23:37

## 2018-01-27 RX ADMIN — FERROUS SULFATE TAB 325 MG (65 MG ELEMENTAL FE) SCH MG: 325 (65 FE) TAB at 08:52

## 2018-01-27 RX ADMIN — DOCUSATE SODIUM SCH MG: 100 CAPSULE ORAL at 08:52

## 2018-01-27 RX ADMIN — HYDROCODONE BITARTRATE AND ACETAMINOPHEN PRN TAB: 5; 325 TABLET ORAL at 13:23

## 2018-01-27 RX ADMIN — FERROUS SULFATE TAB 325 MG (65 MG ELEMENTAL FE) SCH MG: 325 (65 FE) TAB at 23:37

## 2018-01-27 RX ADMIN — IBUPROFEN SCH MG: 600 TABLET ORAL at 03:09

## 2018-01-27 RX ADMIN — IBUPROFEN SCH MG: 600 TABLET ORAL at 03:13

## 2018-01-27 RX ADMIN — IBUPROFEN SCH MG: 600 TABLET ORAL at 15:24

## 2018-01-27 RX ADMIN — IBUPROFEN SCH MG: 600 TABLET ORAL at 08:52

## 2018-01-27 RX ADMIN — HYDROCODONE BITARTRATE AND ACETAMINOPHEN PRN TAB: 5; 325 TABLET ORAL at 08:53

## 2018-01-27 NOTE — POSTPARTUM PROGRESS NOTE
Post Op


Patient seen and examined on 18 at 0900





Post-operative Day #2 s/p PLTCS


Baby will not dc home today due to GBS, hyperbilirubinemia





Subjective:


Patient is without complaints. Ambulating, voiding after fuentes removed. 

Tolerating a regular diet without nausea or vomiting. Normal lochia. Pain is 

well controlled with oral pain medications. Passing flatus.  breast feeding. [








Physical Exam:


General - Alert and oriented, no apparent distress


Abdomen - Soft, appropriately tender to palpation, non-distended, fundus firm 

at umbilicus


Incision - clean, dry and intact; no erythema or induration, no drainage


Extremities - no edema, negative Jay Jay's bilaterally








Assessment:


1 post-operative day # 2, status post RLTCS.


Recovering well, hemodynamically stable


2.  Acute blood loss anemia - hgb 8.9.  VSS stable








Plan:


Routine post-operative care.


Encourage breast feeding.


Encourage ambulation.


VTE prophylaxis:  SCDs.


Ferrous sulfate supplementation.


Plan for discharge tomorrow





Vitals - Labs


Vital Signs - I&O





Vital Signs








  Date Time  Temp Pulse Resp B/P (MAP) Pulse Ox O2 Delivery O2 Flow Rate FiO2


 


18 13:25 98.7 85 18 131/72 (91) 98 Room Air  


 


18 08:45 98.4 92 18 122/71 (88) 97 Room Air  


 


18 03:13 97.7 85 20 129/76 (93) 98 Room Air  


 


18 20:37 97.6 78 20 125/83 (97) 98 Room Air  


 


18 15:51 98.4 107 20 151/86 (107)    














I & O 


 


 18





 07:00


 


Intake Total 3600 ml


 


Balance 3600 ml

















LISHA LEYVA DO 2018 13:31

## 2018-01-27 NOTE — PROGRESS NOTE-STANDARD
Standard Progress Note


Progress Notes/Assess & Plan


Date Seen by Provider:  Jan 27, 2018


Time Seen by Provider:  13:30


Progress/Assessment & Plan


Baby not discharged.  Baby dc to parent room


Continued on antibiotics x 24 hours after delivery due to low grade 

chorioamnionitis and ROM > 18 hours.  Patient contineus to do well pp.








Vital Sign - Last 12Hours








 1/27/18 1/27/18 1/27/18





 03:13 08:45 13:25


 


Temp 97.7 98.4 98.7


 


Pulse 85 92 85


 


Resp 20 18 18


 


B/P (MAP) 129/76 (93) 122/71 (88) 131/72 (91)


 


Pulse Ox 98 97 98


 


O2 Delivery Room Air Room Air Room Air














Intake and Output 


 


 1/27/18





 00:00


 


Intake Total 2100 ml


 


Balance 2100 ml











Inc C/D/I


DC home











LISHA LEYVA DO Jan 27, 2018 13:32